# Patient Record
Sex: MALE | Race: WHITE | URBAN - METROPOLITAN AREA
[De-identification: names, ages, dates, MRNs, and addresses within clinical notes are randomized per-mention and may not be internally consistent; named-entity substitution may affect disease eponyms.]

---

## 2019-07-11 ENCOUNTER — HISTORICAL (OUTPATIENT)
Dept: PREADMISSION TESTING | Facility: HOSPITAL | Age: 64
End: 2019-07-11

## 2019-07-11 LAB
ABS NEUT (OLG): 4.72 X10(3)/MCL (ref 2.1–9.2)
ALBUMIN SERPL-MCNC: 4.1 GM/DL (ref 3.4–5)
ALBUMIN/GLOB SERPL: 1.5 RATIO (ref 1.1–2)
ALP SERPL-CCNC: 81 UNIT/L (ref 50–136)
ALT SERPL-CCNC: 24 UNIT/L (ref 12–78)
APTT PPP: 38.2 SECOND(S) (ref 24.2–33.9)
AST SERPL-CCNC: 15 UNIT/L (ref 15–37)
BASOPHILS # BLD AUTO: 0.1 X10(3)/MCL (ref 0–0.2)
BASOPHILS NFR BLD AUTO: 1 %
BILIRUB SERPL-MCNC: 0.9 MG/DL (ref 0.2–1)
BILIRUBIN DIRECT+TOT PNL SERPL-MCNC: 0.2 MG/DL (ref 0–0.5)
BILIRUBIN DIRECT+TOT PNL SERPL-MCNC: 0.7 MG/DL (ref 0–0.8)
BUN SERPL-MCNC: 13 MG/DL (ref 7–18)
CALCIUM SERPL-MCNC: 9 MG/DL (ref 8.5–10.1)
CHLORIDE SERPL-SCNC: 106 MMOL/L (ref 98–107)
CO2 SERPL-SCNC: 31 MMOL/L (ref 21–32)
CREAT SERPL-MCNC: 0.76 MG/DL (ref 0.7–1.3)
EOSINOPHIL # BLD AUTO: 0.1 X10(3)/MCL (ref 0–0.9)
EOSINOPHIL NFR BLD AUTO: 2 %
ERYTHROCYTE [DISTWIDTH] IN BLOOD BY AUTOMATED COUNT: 12.5 % (ref 11.5–17)
GLOBULIN SER-MCNC: 2.8 GM/DL (ref 2.4–3.5)
GLUCOSE SERPL-MCNC: 84 MG/DL (ref 74–106)
HCT VFR BLD AUTO: 45.7 % (ref 42–52)
HGB BLD-MCNC: 14.4 GM/DL (ref 14–18)
INR PPP: 1.1 (ref 0–1.3)
LYMPHOCYTES # BLD AUTO: 1.4 X10(3)/MCL (ref 0.6–4.6)
LYMPHOCYTES NFR BLD AUTO: 20 %
MCH RBC QN AUTO: 29.8 PG (ref 27–31)
MCHC RBC AUTO-ENTMCNC: 31.5 GM/DL (ref 33–36)
MCV RBC AUTO: 94.6 FL (ref 80–94)
MONOCYTES # BLD AUTO: 0.6 X10(3)/MCL (ref 0.1–1.3)
MONOCYTES NFR BLD AUTO: 9 %
NEUTROPHILS # BLD AUTO: 4.72 X10(3)/MCL (ref 2.1–9.2)
NEUTROPHILS NFR BLD AUTO: 69 %
PLATELET # BLD AUTO: 234 X10(3)/MCL (ref 130–400)
PMV BLD AUTO: 11.4 FL (ref 9.4–12.4)
POTASSIUM SERPL-SCNC: 4.8 MMOL/L (ref 3.5–5.1)
PROT SERPL-MCNC: 6.9 GM/DL (ref 6.4–8.2)
PROTHROMBIN TIME: 13.9 SECOND(S) (ref 12–14)
RBC # BLD AUTO: 4.83 X10(6)/MCL (ref 4.7–6.1)
SODIUM SERPL-SCNC: 142 MMOL/L (ref 136–145)
WBC # SPEC AUTO: 6.9 X10(3)/MCL (ref 4.5–11.5)

## 2019-07-31 ENCOUNTER — HISTORICAL (OUTPATIENT)
Dept: ADMINISTRATIVE | Facility: HOSPITAL | Age: 64
End: 2019-07-31

## 2019-09-19 ENCOUNTER — HISTORICAL (OUTPATIENT)
Dept: LAB | Facility: HOSPITAL | Age: 64
End: 2019-09-19

## 2019-09-22 LAB — FINAL CULTURE: NORMAL

## 2021-08-17 ENCOUNTER — HISTORICAL (OUTPATIENT)
Dept: ADMINISTRATIVE | Facility: HOSPITAL | Age: 66
End: 2021-08-17

## 2021-08-24 ENCOUNTER — HISTORICAL (OUTPATIENT)
Dept: ADMINISTRATIVE | Facility: HOSPITAL | Age: 66
End: 2021-08-24

## 2022-04-30 NOTE — OP NOTE
Patient:   Dino Tarango             MRN: [id number]            FIN: [id number]               Age:   66 years     Sex:  Male     :  1955   Associated Diagnoses:   None   Author:   Gibran Almonte MD      Preoperative Diagnosis: Cataract Left eye    Postoperative Diagnosis: Cataract Left eye    Procedure: Phacoemulsification with intaocular lens implantations Left eye    Surgeon: Gibran Almonte MD    Assistant: Sarah Cruz, [facility]    Anestheisa: MAC    Complications: None    The patient was brought into the operating suite, where the patient was correctly identified as was the operative eye via timeout.  The patient was prepped and draped in a sterile ophthalmic fashion.  A lid speculum was placed in the operative eye and the microscope was brought into place.  A 1.0mm paracentesis was then made at (6) o'clock.  The anterior chamber was filled with Endocoat.  A (temporal) clear corneal incision was made with a 2.4 mm keratome.  A 6 mm corneal marking ring was used to erick the cornea centered over the visual axis.  A 5.00 mm continuous curvilinear capsulorhexis was fashioned using a cystotome and microcapsular forceps.  Hydrodissection and hydrodelineation was performed with upreserved 1% Xylocaine.  The nucleus was then phacoemulsified with the Abbott phacoemulsification hand-piece with a total of (3) EFX.  The cortex was then removed with the I/A hand-piece. The lens model (ZCB00) with a power of (25.0) was placed in the capsular bag.  The Helon was then removed from the eye with the I/A hand piece.  The anterior chamber was inflated and the wounds were hydrated with BSS.  The wounds were checked with Weck-Kaity sponges and found to be watertight.  The lid speculum was removed and topical antibiotics were placed on the operative eye.  The patient was brought to PACU in good condition.

## 2022-04-30 NOTE — OP NOTE
DATE OF SURGERY:    07/31/2019    SURGEON:  Ke Dunn MD    PREOPERATIVE DIAGNOSES:    1. Peripheral vision loss.  2. Brow ptosis.  3. Dermatochalasis, bilateral upper eyelids.  4. Dermatochalasis, herniation of orbital fat.    PROCEDURE PERFORMED:    1. Mid forehead lift, bilateral.  2. Bilateral upper blepharoplasties.  3. Lower blepharoplasty, bilateral with subtransconj approach with skin pinch.    PROCEDURE IN DETAIL:  The patient was brought to the operative suite and placed in supine position.  Anesthesia administered general anesthesia with endotracheal intubation.  The patient was prepped and draped in sterile fashion.  Curvilinear incisions were made in the patient's preexisting forehead wrinkles and a 15 blade was used to incise the skin.  Bipolar cautery was used for hemostasis and this was dissected down to the patient's frontalis.  Dissection inferiorly to the patient's orbicularis was dissected with blunt dissection.  The patient's brow was then suspended with 4-0 PDS suture and then excess skin was excised to allow for elevation of the patient's brow.  Skin was then reapproximated with a running 5-0 Monocryl suture subcutaneous.  This was done in the same fashion on patient's left side.  Next, the upper blepharoplasty was performed starting on the patient's right side.  The natural skin crease, which was marked preoperatively, was noted and then gathering of the excess skin was done with a brown forceps to allow for slight eversion of the eyelashes.  This was marked and injected with 3 mL of 1% lidocaine with 1:100,000 of epinephrine, incised sharply with a 15 blade.  The skin was removed, leaving the orbicularis and septum intact.  The skin was then reapproximated with a running 5-0 plain gut suture.  This was done in the same exact fashion on the patient's left side.  The patient was placed in a head wrap and then turned over to anesthesia for extubation.  The patient tolerated the  procedure well without complication.     We turned our attention to the bilateral upper eyelids.  The patient's natural skin crease was identified preoperatively which was marked.  Appropriate grasping of the excess eyelid skin was done, just enough to the slight eversion of the patient's upper eyelashes.  This incision was then marked, and a fusiform incision was then incised with a 15-blade through the skin, and excess skin was removed only.  The medial fat pad was identified, and after incising the patient's orbital septum, it was expressed and removed judiciously.  Hemostasis was obtained with bipolar cautery.  The incisions were then closed with a running 5-0 plain gut suture.  This was done in the same fashion on both bilateral upper eyelids.  Next, we turned our attention to the patient's lower eyelids, where a Desmarres retractor was used to expose the patient's conjunctiva.  A corneal shield was placed in the patient's globe with Lacri-Lube.  Mild pressure on the patient's globe expressed the patient's fat pads which allowed for visualization of these fat pads.  The lower lid was then incised, transconjunctival approach, 1 mm posterior to the patient's tarsal plate.  The incision was carried preseptal to the inferior orbital rim.  The septum was then incised sharply with scissors.  Expression of the patient's middle, medial, and lateral fat pads was done.  The inferior oblique rectus muscle was identified.  The fat pads were trimmed with bipolar cautery.  The conjunctivae were re-draped.     Next, the skin pinch was performed by gathering of the excess lower eyelid skin with forceps, and this was sharply incised with a sharp scissors.  A running 5-0 plain gut suture was used to reapproximate the skin.  This approach was done in the same fashion on the patient's contralateral side.     The patient was then placed in a head dressing, and turned over to Anesthesia for extubation.  The patient tolerated this  procedure well and without complications.        ______________________________  MD TIFF Edmond  DD:  07/31/2019  Time:  11:45AM  DT:  07/31/2019  Time:  11:56AM  Job #:  [id number]

## 2022-04-30 NOTE — OP NOTE
Patient:   Dino Tarango             MRN: [id number]            FIN: [id number]               Age:   66 years     Sex:  Male     :  1955   Associated Diagnoses:   None   Author:   Gibran Almonte MD      Preoperative Diagnosis: Cataract Right eye    Postoperative Diagnosis: Cataract Right eye    Procedure: Phacoemulsification with intaocular lens implantations Right eye    Surgeon: Gibran Almonte MD    Assistant:Sarah Cruz, [facility]    Anestheisa: MAC    Complications: None    The patient was brought into the operating suite, where the patient was correctly identified as was the operative eye via timeout.  The patient was prepped and draped in a sterile ophthalmic fashion.  A lid speculum was placed in the operative eye and the microscope was brought into place.  A 1.0mm paracentesis was then made at (12) o'clock.  The anterior chamber was filled with Endocoat.  A (temporal) clear corneal incision was made with a 2.4 mm keratome.  A 6 mm corneal marking ring was used to erick the cornea centered over the visual axis.  A 5.00 mm continuous curvilinear capsulorhexis was fashioned using a cystotome and microcapsular forceps.  Hydrodissection and hydrodelineation was performed with upreserved 1% Xylocaine.  The nucleus was then phacoemulsified with the Abbott phacoemulsification hand-piece with a total of (1) EFX.  The cortex was then removed with the I/A hand-piece. The lens model (ZCB00) with a power of (22.0) was placed in the capsular bag.  The Helon was then removed from the eye with the I/A hand piece.  The anterior chamber was inflated and the wounds were hydrated with BSS.  The wounds were checked with Weck-Kaity sponges and found to be watertight.  The lid speculum was removed and topical antibiotics were placed on the operative eye.  The patient was brought to PACU in good condition.